# Patient Record
(demographics unavailable — no encounter records)

---

## 2024-11-05 NOTE — DEVELOPMENTAL MILESTONES
[Normal Development] : Normal Development [None] : none [Ties shoes] : ties shoes [Is dry day and night] : is dry day and night [Starts/continues conversation with peers] : starts/continues conversation with peers [Tells a story with a beginning,] : tells a story with a beginning, a middle, and an end [Masters all consonant sounds and] : masters all consonant sounds and combinations, such as "d" or "ch" [Counts 10 objects] : counts 10 objects [Rides a standard bike] : rides a standard bike [Hops on one foot 3 to 4 times] : hops on one foot 3 to 4 times

## 2024-11-05 NOTE — PHYSICAL EXAM
[Alert] : alert [No Acute Distress] : no acute distress [Normocephalic] : normocephalic [Conjunctivae with no discharge] : conjunctivae with no discharge [PERRL] : PERRL [EOMI Bilateral] : EOMI bilateral [Auricles Well Formed] : auricles well formed [Clear Tympanic membranes with present light reflex and bony landmarks] : clear tympanic membranes with present light reflex and bony landmarks [No Discharge] : no discharge [Nares Patent] : nares patent [Pink Nasal Mucosa] : pink nasal mucosa [Palate Intact] : palate intact [Nonerythematous Oropharynx] : nonerythematous oropharynx [Supple, full passive range of motion] : supple, full passive range of motion [No Palpable Masses] : no palpable masses [Symmetric Chest Rise] : symmetric chest rise [Clear to Auscultation Bilaterally] : clear to auscultation bilaterally [Regular Rate and Rhythm] : regular rate and rhythm [Normal S1, S2 present] : normal S1, S2 present [No Murmurs] : no murmurs [+2 Femoral Pulses] : +2 femoral pulses [Soft] : soft [NonTender] : non tender [Non Distended] : non distended [Normoactive Bowel Sounds] : normoactive bowel sounds [No Hepatomegaly] : no hepatomegaly [No Splenomegaly] : no splenomegaly [Shane: _____] : Shane [unfilled] [Patent] : patent [No fissures] : no fissures [No Abnormal Lymph Nodes Palpated] : no abnormal lymph nodes palpated [No Gait Asymmetry] : no gait asymmetry [No pain or deformities with palpation of bone, muscles, joints] : no pain or deformities with palpation of bone, muscles, joints [Normal Muscle Tone] : normal muscle tone [Straight] : straight [No Scoliosis] : no scoliosis [+2 Patella DTR] : +2 patella DTR [Cranial Nerves Grossly Intact] : cranial nerves grossly intact [No Rash or Lesions] : no rash or lesions

## 2024-11-05 NOTE — DISCUSSION/SUMMARY
[Normal Growth] : growth [Normal Development] : development [None] : No known medical problems [No Elimination Concerns] : elimination [No Feeding Concerns] : feeding [No Skin Concerns] : skin [Normal Sleep Pattern] : sleep [School Readiness] : school readiness [Mental Health] : mental health [Nutrition and Physical Activity] : nutrition and physical activity [Oral Health] : oral health [Safety] : safety [No Medications] : ~He/She~ is not on any medications [Patient] : patient [FreeTextEntry1] : Continue balanced diet with all food groups. Brush teeth twice a day with toothbrush. Recommend visit to dentist. Help child to maintain consistent daily routines and sleep schedule. School discussed. Ensure home is safe. Teach child about personal safety. Use consistent, positive discipline. Limit screen time to no more than 2 hours per day. Encourage physical activity. Child needs to ride in a belt-positioning booster seat until  4 feet 9 inches has been reached and are between 8 and 12 years of age.

## 2024-11-05 NOTE — HISTORY OF PRESENT ILLNESS
[Mother] : mother [Normal] : Normal [Playtime (60 min/d)] : Playtime 60 min a day [Appropiate parent-child-sibling interaction] : Appropriate parent-child-sibling interaction [Parent has appropriate responses to behavior] : Parent has appropriate responses to behavior [Grade ___] : Grade [unfilled] [No] : No cigarette smoke exposure [Water heater temperature set at <120 degrees F] : Water heater temperature set at <120 degrees F [Car seat in back seat] : Car seat in back seat [Carbon Monoxide Detectors] : Carbon monoxide detectors [Smoke Detectors] : Smoke detectors [Supervised outdoor play] : Supervised outdoor play [Up to date] : Up to date [FreeTextEntry1] : 6 year  girl well visit: Parental concerns: none Recent injury/illness:  none Special health care needs:  none Visits to other health care providers/facilities:  none Changes/stressors in family or home: none Observation of parent-child interaction: normal (parents/infant respond to each other; parents attentive and comfort.[

## 2025-01-03 NOTE — HISTORY OF PRESENT ILLNESS
[de-identified] : Cherie is a now 5y/o female referred for hematological evaluation of anemia. Upon chart review, it appears Cherie has been anemic for a while - at least since July 2022. On 7/28/22 - hemoglobin 7.8, MCV 63.7, MCHC 28, RDW 20. Additional labs done on 7/29/22 revealed TIBC 502. She was found to have positive occult blood at this time and was found to have Celiac disease. She eliminated gluten from her diet at this point. Took about two months of oral iron supplementation. Ferritin was found to be 9 in September of 2022 and iron was discontinued.  Labs were done with PCP, on 6/14/24, revealing a hemoglobin of 9, MCV Of 71.3, RDW of 15.9 with a ferritin of 3. Also had a negative stool guaiac test at this time. She was re-started on oral iron, although developed a rash with itchiness after taking it so they discontinued it. Denied any trouble breathing or GI symptoms.  Initially seen on 7/16/24: "Mom feels that Cherie is often fatigued after activity and does c/o headaches and dizziness. They are going to [place] in August for the month.  She has a well-varied diet - eats meat, lentils, chicken, nuts, and eggs. She was breast-fed exclusively until 10 months of age. Mom does report that she would drink a lot of milk (at least 16 ounces) when she turned one. She currently drinks maybe one glass of milk/day."  At this visit, Hgb 10.4, MCV 72.2, and RDW 18.6. Diagnosed with iron deficiency likely r/t h/o exclusive breast-feeding as an infant, excessive milk intake as a toddler, and history of GI blood loss r/t Celiac disease. Received three doses of Venofer (7/16, 7/22, 7/30).  Last seen on 9/27/24: Hgb 12.8. Ferritin 32. STfR 22.2.     [de-identified] : Cherie has been doing well since the last appointment. Mom does not she c/o headache last week. Also appeared to have a stomach virus last week. Still avoiding gluten. No other interim history to report.

## 2025-01-03 NOTE — HISTORY OF PRESENT ILLNESS
[de-identified] : Cherie is a now 5y/o female referred for hematological evaluation of anemia. Upon chart review, it appears Cherie has been anemic for a while - at least since July 2022. On 7/28/22 - hemoglobin 7.8, MCV 63.7, MCHC 28, RDW 20. Additional labs done on 7/29/22 revealed TIBC 502. She was found to have positive occult blood at this time and was found to have Celiac disease. She eliminated gluten from her diet at this point. Took about two months of oral iron supplementation. Ferritin was found to be 9 in September of 2022 and iron was discontinued.  Labs were done with PCP, on 6/14/24, revealing a hemoglobin of 9, MCV Of 71.3, RDW of 15.9 with a ferritin of 3. Also had a negative stool guaiac test at this time. She was re-started on oral iron, although developed a rash with itchiness after taking it so they discontinued it. Denied any trouble breathing or GI symptoms.  Initially seen on 7/16/24: "Mom feels that Cherie is often fatigued after activity and does c/o headaches and dizziness. They are going to [place] in August for the month.  She has a well-varied diet - eats meat, lentils, chicken, nuts, and eggs. She was breast-fed exclusively until 10 months of age. Mom does report that she would drink a lot of milk (at least 16 ounces) when she turned one. She currently drinks maybe one glass of milk/day."  At this visit, Hgb 10.4, MCV 72.2, and RDW 18.6. Diagnosed with iron deficiency likely r/t h/o exclusive breast-feeding as an infant, excessive milk intake as a toddler, and history of GI blood loss r/t Celiac disease. Received three doses of Venofer (7/16, 7/22, 7/30).  Last seen on 9/27/24: Hgb 12.8. Ferritin 32. STfR 22.2.     [de-identified] : Cherie has been doing well since the last appointment. Mom does not she c/o headache last week. Also appeared to have a stomach virus last week. Still avoiding gluten. No other interim history to report.

## 2025-03-11 NOTE — PHYSICAL EXAM
[General Appearance - Alert] : alert [General Appearance - Well Nourished] : well nourished [General Appearance - In No Acute Distress] : in no acute distress [General Appearance - Well Developed] : well developed [General Appearance - Well-Appearing] : well appearing [Appearance Of Head] : the head was normocephalic [Facies] : there were no dysmorphic facial features [Sclera] : the conjunctiva were normal [EOMI] : ~T the extraocular movements were intact [PERRL With Normal Accommodation] : the pupils were equal in size, round, and reactive to light [Outer Ear] : the ears and nose were normal in appearance [Nasal Cavity] : the nasal mucosa was normal [Examination Of The Oral Cavity] : mucous membranes were moist and pink [Oropharynx] : the oropharynx was normal [No Cough] : no cough [Auscultation Breath Sounds / Voice Sounds] : breath sounds clear to auscultation bilaterally [Stridor] : no stridor was observed [Respiration, Rhythm And Depth] : normal respiratory rhythm and effort [Normal Chest Appearance] : the chest was normal in appearance [Apical Impulse] : quiet precordium with normal apical impulse [Heart Rate And Rhythm] : normal heart rate and rhythm [Heart Sounds] : normal S1 and S2 [No Murmur] : no murmurs  [Heart Sounds Gallop] : no gallops [Heart Sounds Pericardial Friction Rub] : no pericardial rub [Edema] : no edema [Arterial Pulses] : normal upper and lower extremity pulses with no pulse delay [Heart Sounds Click] : no clicks [Capillary Refill Test] : normal capillary refill [Bowel Sounds] : normal bowel sounds [Abdomen Soft] : soft [Nondistended] : nondistended [Abdomen Tenderness] : non-tender [Nail Clubbing] : no clubbing  or cyanosis of the fingers [Musculoskeletal Exam: Normal Movement Of All Extremities] : normal movements of all extremities [Motor Tone] : normal muscle strength and tone [Cervical Lymph Nodes Enlarged Anterior] : The anterior cervical nodes were normal [] : no rash [Skin Lesions] : no lesions [Skin Turgor] : normal turgor [Skin Color & Pigmentation] : normal skin color and pigmentation [Demonstrated Behavior - Infant Nonreactive To Parents] : interactive [Mood] : mood and affect were appropriate for age [Demonstrated Behavior] : normal behavior

## 2025-03-11 NOTE — PHYSICAL EXAM
[General Appearance - Alert] : alert [General Appearance - In No Acute Distress] : in no acute distress [General Appearance - Well Nourished] : well nourished [General Appearance - Well Developed] : well developed [General Appearance - Well-Appearing] : well appearing [Appearance Of Head] : the head was normocephalic [Facies] : there were no dysmorphic facial features [Sclera] : the sclera were normal [EOMI] : ~T the extraocular movements were intact [PERRL With Normal Accommodation] : the pupils were equal in size, round, and reactive to light [Outer Ear] : the ears and nose were normal in appearance [Nasal Cavity] : the nasal mucosa was normal [Examination Of The Oral Cavity] : mucous membranes were moist and pink [Oropharynx] : the oropharynx was normal [No Cough] : no cough [Auscultation Breath Sounds / Voice Sounds] : breath sounds clear to auscultation bilaterally [Stridor] : no stridor was observed [Respiration, Rhythm And Depth] : normal respiratory rhythm and effort [Normal Chest Appearance] : the chest was normal in appearance [Apical Impulse] : quiet precordium with normal apical impulse [Heart Rate And Rhythm] : normal heart rate and rhythm [Heart Sounds] : normal S1 and S2 [No Murmur] : no murmurs  [Heart Sounds Gallop] : no gallops [Heart Sounds Pericardial Friction Rub] : no pericardial rub [Edema] : no edema [Arterial Pulses] : normal upper and lower extremity pulses with no pulse delay [Heart Sounds Click] : no clicks [Capillary Refill Test] : normal capillary refill [Bowel Sounds] : normal bowel sounds [Abdomen Soft] : soft [Nondistended] : nondistended [Abdomen Tenderness] : non-tender [Nail Clubbing] : no clubbing  or cyanosis of the fingers [Musculoskeletal Exam: Normal Movement Of All Extremities] : normal movements of all extremities [Motor Tone] : normal muscle strength and tone [Cervical Lymph Nodes Enlarged Anterior] : The anterior cervical nodes were normal [] : no rash [Skin Lesions] : no lesions [Skin Turgor] : normal turgor [Skin Color & Pigmentation] : normal skin color and pigmentation [Demonstrated Behavior - Infant Nonreactive To Parents] : interactive [Mood] : mood and affect were appropriate for age [Demonstrated Behavior] : normal behavior

## 2025-03-14 NOTE — HISTORY OF PRESENT ILLNESS
[de-identified] : Cherie is a now 5y/o female referred for hematological evaluation of anemia. Upon chart review, it appears Cherie has been anemic for a while - at least since July 2022. On 7/28/22 - hemoglobin 7.8, MCV 63.7, MCHC 28, RDW 20. Additional labs done on 7/29/22 revealed TIBC 502. She was found to have positive occult blood at this time and was found to have Celiac disease. She eliminated gluten from her diet at this point. Took about two months of oral iron supplementation. Ferritin was found to be 9 in September of 2022 and iron was discontinued.  Labs were done with PCP, on 6/14/24, revealing a hemoglobin of 9, MCV Of 71.3, RDW of 15.9 with a ferritin of 3. Also had a negative stool guaiac test at this time. She was re-started on oral iron, although developed a rash with itchiness after taking it so they discontinued it. Denied any trouble breathing or GI symptoms.  Initially seen on 7/16/24: "Mom feels that Cherie is often fatigued after activity and does c/o headaches and dizziness. They are going to [place] in August for the month.  She has a well-varied diet - eats meat, lentils, chicken, nuts, and eggs. She was breast-fed exclusively until 10 months of age. Mom does report that she would drink a lot of milk (at least 16 ounces) when she turned one. She currently drinks maybe one glass of milk/day."  At this visit, Hgb 10.4, MCV 72.2, and RDW 18.6. Diagnosed with iron deficiency likely r/t h/o exclusive breast-feeding as an infant, excessive milk intake as a toddler, and history of GI blood loss r/t Celiac disease. Received three doses of Venofer (7/16, 7/22, 7/30).  Seen on 9/27/24: Hgb 12.8. Ferritin 32. STfR 22.2.   Last seen on 12/27/24. Hemoglobin 13.4. Ferritin 23, STfR elevated. Asked to return in two-months.    [de-identified] : Mom does report that Cherie has been dizzy this past month and occasionally "seeing colors". She was seen by cardiology this morning who suggested ophthalmology consult. Still avoiding gluten. No other interim history to report.

## 2025-03-14 NOTE — HISTORY OF PRESENT ILLNESS
[de-identified] : Cherie is a now 7y/o female referred for hematological evaluation of anemia. Upon chart review, it appears Cherie has been anemic for a while - at least since July 2022. On 7/28/22 - hemoglobin 7.8, MCV 63.7, MCHC 28, RDW 20. Additional labs done on 7/29/22 revealed TIBC 502. She was found to have positive occult blood at this time and was found to have Celiac disease. She eliminated gluten from her diet at this point. Took about two months of oral iron supplementation. Ferritin was found to be 9 in September of 2022 and iron was discontinued.  Labs were done with PCP, on 6/14/24, revealing a hemoglobin of 9, MCV Of 71.3, RDW of 15.9 with a ferritin of 3. Also had a negative stool guaiac test at this time. She was re-started on oral iron, although developed a rash with itchiness after taking it so they discontinued it. Denied any trouble breathing or GI symptoms.  Initially seen on 7/16/24: "Mom feels that Cherie is often fatigued after activity and does c/o headaches and dizziness. They are going to [place] in August for the month.  She has a well-varied diet - eats meat, lentils, chicken, nuts, and eggs. She was breast-fed exclusively until 10 months of age. Mom does report that she would drink a lot of milk (at least 16 ounces) when she turned one. She currently drinks maybe one glass of milk/day."  At this visit, Hgb 10.4, MCV 72.2, and RDW 18.6. Diagnosed with iron deficiency likely r/t h/o exclusive breast-feeding as an infant, excessive milk intake as a toddler, and history of GI blood loss r/t Celiac disease. Received three doses of Venofer (7/16, 7/22, 7/30).  Seen on 9/27/24: Hgb 12.8. Ferritin 32. STfR 22.2.   Last seen on 12/27/24. Hemoglobin 13.4. Ferritin 23, STfR elevated. Asked to return in two-months.    [de-identified] : Mom does report that Cherie has been dizzy this past month and occasionally "seeing colors". She was seen by cardiology this morning who suggested ophthalmology consult. Still avoiding gluten. No other interim history to report.

## 2025-03-17 NOTE — CONSULT LETTER
[Today's Date] : [unfilled] [Name] : Name: [unfilled] [] : : ~~ [Today's Date:] : [unfilled] [Dear  ___:] : Dear Dr. [unfilled]: [Consult] : I had the pleasure of evaluating your patient, [unfilled]. My full evaluation follows. [Consult - Single Provider] : Thank you very much for allowing me to participate in the care of this patient. If you have any questions, please do not hesitate to contact me. [Sincerely,] : Sincerely, [FreeTextEntry4] : Le Churchill MD [FreeTextEntry5] : [address] [FreeTextEntry6] : [place] [address] [de-identified] : Barry E. Goldberg, MD, FACC, FAAP, FASE\par  [facility]\par  [facility]\par  Chief Pediatric Cardiology\par

## 2025-03-17 NOTE — CARDIOLOGY SUMMARY
[de-identified] : 03/11/2025 [FreeTextEntry1] : Normal Sinus Rhythm Normal Axis QTc  418-420 ms [de-identified] : 03/11/2025 [FreeTextEntry2] : Summary: 1. Trivial mitral valve regurgitation. 2. Normal left ventricular size, morphology and systolic function. 3. No pericardial effusion.  [de-identified] : 1/25/2021 [de-identified] : The results of the 24-hour Holter monitor placed at last visit reviewed in detail today. The heart rate ranged from  beats per minute with an average of 108  beats per minute. The predominant rhythm was normal sinus rhythm alternating with sinus bradycardia, sinus tachycardia and sinus arrhythmia. There were no supraventricular premature beats. There were no ventricular premature beats. There were no symptoms reported during the monitoring period.\par

## 2025-03-17 NOTE — CARDIOLOGY SUMMARY
[de-identified] : 03/11/2025 [FreeTextEntry1] : Normal Sinus Rhythm Normal Axis QTc  418-420 ms [de-identified] : 03/11/2025 [FreeTextEntry2] : Summary: 1. Trivial mitral valve regurgitation. 2. Normal left ventricular size, morphology and systolic function. 3. No pericardial effusion.  [de-identified] : 1/25/2021 [de-identified] : The results of the 24-hour Holter monitor placed at last visit reviewed in detail today. The heart rate ranged from  beats per minute with an average of 108  beats per minute. The predominant rhythm was normal sinus rhythm alternating with sinus bradycardia, sinus tachycardia and sinus arrhythmia. There were no supraventricular premature beats. There were no ventricular premature beats. There were no symptoms reported during the monitoring period.\par

## 2025-03-17 NOTE — REASON FOR VISIT
[Follow-Up] : a follow-up visit for [Dizziness/Lightheadedness] : dizziness/lightheadedness [Mother] : mother [FreeTextEntry3] : headaches, heart racing

## 2025-03-17 NOTE — DISCUSSION/SUMMARY
[May participate in all age-appropriate activities] : [unfilled] May participate in all age-appropriate activities. [Influenza vaccine is recommended] : Influenza vaccine is recommended [FreeTextEntry1] : JOHNIE's  workup revealed: -Over the past month she has been complaining of dizziness and seeing colors.  She has not had any additional syncopal episodes. It is unclear the etiology of her symptoms. It may represent pre-syncope of a vasovagal origin.  -There were no Arrhythmias seen on the prior 24-hour Holter monitor -She has an innocent heart murmur -She had Trivial mitral valve regurgitation. It is very trivial and the valve looks normal.  -Her CBC performed later that day was normal She  does not require any restrictions from a cardiac standpoint. -The importance of excellent hydration starting early in the morning and continue throughout the day was discussed at length.  -She  is clear from cardiology for all medications. -She   is clear from cardiology for all surgeries and anesthesia/sedation.  -She does not require antibiotic prophylaxis from a cardiac standpoint.  - She  should continue with her   routine pediatric care.  -She will see Heme today and mom will call me -Will add salt  to her diet -Follow in 3months if still dizzy -Needs ophthalmology   [Needs SBE Prophylaxis] : [unfilled] does not need bacterial endocarditis prophylaxis

## 2025-03-17 NOTE — HISTORY OF PRESENT ILLNESS
[FreeTextEntry1] : JOHNIE was seen on Mar 11, 2025, pediatric cardiology follow up. She was last evaluated on Feb 27, 2024. JOHNIE is a 6-year-old female who was previously referred for cardiology consultation due to syncope.  She had Tonsils and Adenoids removed on April 24, 2024. It was performed at [facility] in [place]. Surgery went well without issues She was diagnosed with low iron and has received 3 doses of iron infusion. It is believed to be related to celiac. Testing for Crohn's was negative.  Over the past month she has been complaining of dizziness and seeing colors.  She has not had any additional syncopal episodes. She  denies chest pain, palpitations, shortness of breath, diaphoresis, or nausea.  In the past she was seen by neurology and no etiology has been found. She is active in gymnastics, tennis and golf. She is getting fatigued more easily as compared after the iron infusions.  She has an appointment with [facility] today. She has not had a CBC in a while. She is not on any medication. She has not had an ophthalmologic evaluation. Her urine ranges from clear to orange. She does take a daily vitamin.  She has had frequent strep infections in the past and a T and A was done. (She also was snoring) However, in Jan 2024 she had fever almost every night. She had dizziness and had complained her "heart was farting". She also complained of body shaking.  The symptoms responded to antibiotics and over the past two weeks she has improved. She is completely asymptomatic at this time.  She had strep in December 2024. She did not complete her course of antibiotics because she developed symptoms suggestive of a stomach virus.   She was born at term after an eventful pregnancy for passed meconium prior to delivery. She was discharged with his mother.  She  has never been hospitalized overnight.   There has been no interim medical problems.   JOHNIE was diagnosed with COVID-19 about one year ago.   Mom is wellDad is healthy. There is full sister who has celiac disease. . There is a brother who is well. There are 3 1/2 siblings through dad , one has autism. . Importantly, there is no family history of recurrent syncope, premature sudden death, cardiomyopathy, arrhythmia, drowning, or unexplained accidental deaths.

## 2025-03-17 NOTE — REVIEW OF SYSTEMS
[Fast HR] : tachycardia [Headache] : headache [Dizziness] : dizziness [Change in Vision] : change in vision [Feeling Poorly] : not feeling poorly (malaise) [Fever] : no fever [Wgt Loss (___ Lbs)] : no recent weight loss [Pallor] : not pale [Eye Discharge] : no eye discharge [Redness] : no redness [Nasal Stuffiness] : no nasal congestion [Sore Throat] : no sore throat [Earache] : no earache [Loss Of Hearing] : no hearing loss [Nosebleeds] : no epistaxis [Cyanosis] : no cyanosis [Edema] : no edema [Diaphoresis] : not diaphoretic [Chest Pain] : no chest pain or discomfort [Exercise Intolerance] : no persistence of exercise intolerance [Palpitations] : no palpitations [Orthopnea] : no orthopnea [Tachypnea] : not tachypneic [Wheezing] : no wheezing [Cough] : no cough [Shortness Of Breath] : not expressed as feeling short of breath [Being A Poor Eater] : not a poor eater [Vomiting] : no vomiting [Diarrhea] : no diarrhea [Decrease In Appetite] : appetite not decreased [Abdominal Pain] : no abdominal pain [Fainting (Syncope)] : no fainting [Seizure] : no seizures [Limping] : no limping [Joint Pains] : no arthralgias [Joint Swelling] : no joint swelling [Rash] : no rash [Wound problems] : no wound problems [Skin Peeling] : no skin peeling [Easy Bruising] : no tendency for easy bruising [Swollen Glands] : no lymphadenopathy [Easy Bleeding] : no ~M tendency for easy bleeding [Sleep Disturbances] : ~T no sleep disturbances [Hyperactive] : no hyperactive behavior [Failure To Thrive] : no failure to thrive [Short Stature] : short stature was not noted [Jitteriness] : no jitteriness [Heat/Cold Intolerance] : no temperature intolerance [Dec Urine Output] : no oliguria

## 2025-03-17 NOTE — HISTORY OF PRESENT ILLNESS
[FreeTextEntry1] : JOHNIE was seen on Mar 11, 2025, pediatric cardiology follow up. She was last evaluated on Feb 27, 2024. JOHNIE is a 6-year-old female who was previously referred for cardiology consultation due to syncope.  She had Tonsils and Adenoids removed on April 24, 2024. It was performed at [facility] in [place]. Surgery went well without issues She was diagnosed with low iron and has received 3 doses of iron infusion. It is believed to be related to celiac. Testing for Crohn's was negative.  Over the past month she has been complaining of dizziness and seeing colors.  She has not had any additional syncopal episodes. She  denies chest pain, palpitations, shortness of breath, diaphoresis, or nausea.  In the past she was seen by neurology and no etiology has been found. She is active in gymnastics, tennis and golf. She is getting fatigued more easily as compared after the iron infusions.  She has an appointment with [facility] today. She has not had a CBC in a while. She is not on any medication. She has not had an ophthalmologic evaluation. Her urine ranges from clear to orange. She does take a daily vitamin.  She has had frequent strep infections in the past and a T and A was done. (She also was snoring) However, in Jan 2024 she had fever almost every night. She had dizziness and had complained her "heart was farting". She also complained of body shaking.  The symptoms responded to antibiotics and over the past two weeks she has improved. She is completely asymptomatic at this time.  She had strep in December 2024. She did not complete her course of antibiotics because she developed symptoms suggestive of a stomach virus.   She was born at term after an eventful pregnancy for passed meconium prior to delivery. She was discharged with his mother.  She  has never been hospitalized overnight.   There has been no interim medical problems.   JOHNIE was diagnosed with COVID-19 about one year ago.   Mom is wellDad is healthy. There is full sister who has celiac disease. . There is a brother who is well. There are 3 1/2 siblings through dad , one has autism. . Importantly, there is no family history of recurrent syncope, premature sudden death, cardiomyopathy, arrhythmia, drowning, or unexplained accidental deaths.

## 2025-03-17 NOTE — CONSULT LETTER
[Today's Date] : [unfilled] [Name] : Name: [unfilled] [] : : ~~ [Today's Date:] : [unfilled] [Dear  ___:] : Dear Dr. [unfilled]: [Consult] : I had the pleasure of evaluating your patient, [unfilled]. My full evaluation follows. [Consult - Single Provider] : Thank you very much for allowing me to participate in the care of this patient. If you have any questions, please do not hesitate to contact me. [Sincerely,] : Sincerely, [FreeTextEntry4] : Le Churchill MD [FreeTextEntry5] : [address] [FreeTextEntry6] : [place] [address] [de-identified] : Barry E. Goldberg, MD, FACC, FAAP, FASE\par  [facility]\par  [facility]\par  Chief Pediatric Cardiology\par